# Patient Record
Sex: FEMALE | Race: WHITE
[De-identification: names, ages, dates, MRNs, and addresses within clinical notes are randomized per-mention and may not be internally consistent; named-entity substitution may affect disease eponyms.]

---

## 2017-03-01 ENCOUNTER — HOSPITAL ENCOUNTER (EMERGENCY)
Dept: HOSPITAL 68 - ERH | Age: 32
End: 2017-03-01
Payer: COMMERCIAL

## 2017-03-01 VITALS — SYSTOLIC BLOOD PRESSURE: 144 MMHG | DIASTOLIC BLOOD PRESSURE: 92 MMHG

## 2017-03-01 VITALS — WEIGHT: 200 LBS | HEIGHT: 60 IN | BODY MASS INDEX: 39.27 KG/M2

## 2017-03-01 DIAGNOSIS — R07.9: ICD-10-CM

## 2017-03-01 DIAGNOSIS — R10.11: Primary | ICD-10-CM

## 2017-03-01 LAB
ABSOLUTE GRANULOCYTE CT: 7.5 /CUMM (ref 1.4–6.5)
BASOPHILS # BLD: 0 /CUMM (ref 0–0.2)
BASOPHILS NFR BLD: 0.3 % (ref 0–2)
EOSINOPHIL # BLD: 0.6 /CUMM (ref 0–0.7)
EOSINOPHIL NFR BLD: 5 % (ref 0–5)
ERYTHROCYTE [DISTWIDTH] IN BLOOD BY AUTOMATED COUNT: 13.2 % (ref 11.5–14.5)
GRANULOCYTES NFR BLD: 63.4 % (ref 42.2–75.2)
HCT VFR BLD CALC: 38.7 % (ref 37–47)
LYMPHOCYTES # BLD: 3 /CUMM (ref 1.2–3.4)
MCH RBC QN AUTO: 28 PG (ref 27–31)
MCHC RBC AUTO-ENTMCNC: 33.3 G/DL (ref 33–37)
MCV RBC AUTO: 83.9 FL (ref 81–99)
MONOCYTES # BLD: 0.7 /CUMM (ref 0.1–0.6)
PLATELET # BLD: 237 /CUMM (ref 130–400)
PMV BLD AUTO: 9.7 FL (ref 7.4–10.4)
RED BLOOD CELL CT: 4.61 /CUMM (ref 4.2–5.4)
WBC # BLD AUTO: 11.8 /CUMM (ref 4.8–10.8)

## 2017-03-01 NOTE — CT SCAN REPORT
EXAMINATION:
CT ABDOMEN AND PELVIS WITHOUT CONTRAST
 
CLINICAL INFORMATION:
Right upper quadrant abdominal pain.
 
COMPARISON:
None
 
TECHNIQUE:
Multidetector volumetric imaging was performed from the superior aspect of
the liver through the pubic symphysis. Sagittal and coronal reformatted
images were obtained on the technologist's workstation.
 
DLP:
813 mGy-cm
 
FINDINGS:
 
LUNG BASES: The visualized lung bases are unremarkable.
 
LIVER, GALLBLADDER, AND BILIARY TREE: The liver is normal in size, shape, and
attenuation. No focal hepatic lesion or biliary ductal dilatation is present.
The gallbladder is mildly distended with a phrygian cap. No gallstones
visualized. No gallbladder wall thickening or pericholecystic fluid.
 
PANCREAS: Unremarkable.
 
SPLEEN: Unremarkable.
 
ADRENAL GLANDS: Unremarkable.
 
KIDNEYS AND URETERS: The kidneys are normal in size, shape, and attenuation.
No hydronephrosis, hydroureter, or calculi seen. No perinephric stranding.
 
BLADDER: Unremarkable.
 
GASTROINTESTINAL TRACT: The stomach and small bowel are unremarkable. No
dilated loops of bowel or evidence of obstruction. Normal appendix. No
colonic wall thickening or inflammatory change. Mild colonic stool burden.
 
ABDOMINAL WALL: No significant hernia is appreciated.
 
LYMPH NODES: Normal.
 
VASCULAR: Unremarkable.
 
PELVIC VISCERA: The uterus and adnexa are unremarkable.
 
OSSEOUS STRUCTURES: No acute or suspicious osseous abnormality. Sclerosis is
seen along the sacroiliac joints.
 
IMPRESSION:
No acute findings of the abdomen or pelvis. The gallbladder is mildly
distended. No wall thickening or adjacent inflammatory changes. No gallstones
are visualized.

## 2017-03-01 NOTE — ED GI/GU/ABDOMINAL COMPLAINT
**See Addendum**
History of Present Illness
 
General
Chief Complaint: Chest Pain
Stated Complaint: CP,SOB
Source: patient, old records
Exam Limitations: no limitations
 
Vital Signs & Intake/Output
Vital Signs & Intake/Output
 Vital Signs
 
 
Date Time Temp Pulse Resp B/P Pulse O2 O2 Flow FiO2
 
     Ox Delivery Rate 
 
2250 98.1 69 17 144/92 97 Room Air  
 
2132     97 Room Air  
 
2102 98.3 79 18 143/76 97 Room Air  
 
 
Allergies
Coded Allergies:
fish derived (Mild, NAUSEA 16)
shellfish derived (Mild, NAUSEA 16)
 
Reconcile Medications
Albuterol Sulfate (Proair Hfa) 0.09 MG/Actuation MIROSLAVA ASTHMA  (Reported)
Ibuprofen 800 MG TABLET   800 MG PO Q6P PRN PAIN SCALE 4-6 (MODERATE)
Prenatal Vit No.130/Iron/FA (Prenatal Tablet) 1 EACH TABLET   1 TAB PO DAILY 
vitamin supprot  (Reported)
 
Triage Note:
PT TO ROOM16 FROM HOME FOR C/O UPPER ABD PAIN 8/10
 RADIATES TO MIDDLE BACK x3DAYS, +NAUSEA. LAST BM
 YESTERDAY WNL. PT DENIES URINARY S/S, DENIES CHEST
 PAIN, REPORTS MILD SOB. PT AFEBRILE, VSS.
Triage Nurses Notes Reviewed? yes
LMP (ages 10-50): post partum 8 months breastfeeding
Pregnant? n
Is pt currently breastfeeding? Yes
Onset: Abrupt
Duration: day(s): (3), constant, getting worse, waxing and waning
Timing: recent history
Quality/Severity: aching, sharpness, severe, throbbing
Severity Numbers: 8
Location: right upper quadrant
Radiation: back
Activities at Onset: none
Prior Abdominal Problems: similar symptoms (gallstones)
Modifying Factors:
Worsens With: eating, palpation. 
Associated Symptoms: nausea/vomiting
HPI:
31-year-old female with past history of cholelithiasis asthma presents to 
emergency room complaining of 3 day history sudden onset right upper quadrant 
abdominal pain radiating into her back that is worse with palpation and eating. 
The patient states she has had history of gallstone attacks in the past however 
this is the worst it is ever been, however feels similar.  She's been taking 
Tylenol without improvement.  She reports some nausea and one episode of 
vomiting yesterday.  No change in her bowel movements.  Contrary to the chief 
complaint listed at triage the patient has no chest pain or shortness of breath 
no cough wheezing hemoptysis.  No sick contacts recent travel.  The patient 
states she was drinking alcohol 3 nights ago prior to the episode beginning.  
She is currently breast-feeding, no recent immobility or travel no history of 
abdominal surgeries in the past she denies tobacco use.
(ELIEZER ULRICH)
 
Past History
 
Travel History
Traveled to [place] past 21 day No
 
Medical History
Any Pertinent Medical History? see below for history
Neurological: NONE
EENT: NONE
Cardiovascular: NONE
Respiratory: asthma
Gastrointestinal: NONE
Hepatic: NONE
Renal: NONE
Musculoskeletal: NONE
Psychiatric: anxiety, depression
Endocrine: NONE (initial BMI 37.5), obesity
Blood Disorders: NONE
Cancer(s): NONE
GYN/Reproductive: NONE
Tetanus Vaccine: 12
 
Surgical History
Surgical History: 2011 TOPS at 9 weeks' 2007 partial resection of partial 
resection  left Bartholin's gland
 
Psychosocial History
Who do you live with Spouse
Services at Home None
What is your primary language English
Tobacco Use: Never used
ETOH Use: occasional use
 
Family History
Family History, If Any:
Relation not specified for:
  FH: hypertension
 
Hx Contributory? No
(ELIEZER ULRICH)
 
Review of Systems
 
Review of Systems
Constitutional:
Reports: see HPI. 
All Other Systems: Reviewed and Negative
Comments
Review of systems: See HPI, All other systems negative.
Constitutional, no chills no fever, no malaise 
HEENT: No visual changes no sore throat no congestion
Cardiovascular: No chest pain , no palpitation 
Skin, no rashes, no change in skin
Respiratory: No dyspnea no cough no  sputum
GI: No nausea no vomiting, no diarrhea
: No dysuria
Muscle skeletal: No joint pain,, no back pain, no neck pain,
Neurologic: No numbness, no headache
Psych: No stress 
Heme/endocrine: No bruising no bleeding 
Immunology: No lymphadenopathy
(ELIEZER ULRICH)
 
Physical Exam
 
Physical Exam
General Appearance: well developed/nourished, no apparent distress, alert, awake
Gastrointestinal: soft, tenderness
Comments:
Well-developed well-nourished person in no acute distress
HEENT: Normal EENT exam; PERRL, EOMI. HEAD is atraumatic. moist mucous 
membranes.  
Neck: Supple, normal range of motion 
Back: Nontender, no CVA tenderness. Full range of motion
Cardiovascular: Regular rate and rhythms no murmurs rubs or gallops
Respiratory: Chest nontender.There were no bony deformities, no asymmetry. No 
respiratory distress.  Patient speaking in full complete sentences. Breath 
sounds clear to auscultation bilaterally: NO W/R/R
Abdomen: Soft, negative  Hicks's, right upper quadrant tenderness palpation 
nondistended, no appreciable organomegaly. Normal bowel sounds. No rebound/
guarding, No ascites.
Extremity: No edema, full range of motion of extremities
Neuro: Alert oriented x3, motor sensory normal,  There were no obvious focal 
neurologic abnormalities.
Skin: No appreciable rash on exposed skin, skin is warm and dry.no jaundice
Psych: Mood and affect is normal, memory and judgment is normal.
 
Core Measures
ACS in differential dx? No
Severe Sepsis Present: No
Septic Shock Present: No
(EVIE KRISHNA,ELIEZER)
 
Progress
Differential Diagnosis: AMI, appendicitis, biliary colic, bowel obstruction, 
colon cancer, cholecystitis, ectopic pregnancy, gastritis, hepatitis, inflamm 
bowel dis, intrauterine pregnancy, pancreatitis, peptic ulcer, PUD/GERD, 
perforated viscous, SBO, threatened AB, UTI/pyelo
Plan of Care:
 Orders
 
 
Procedure Date/time Status
 
URINALYSIS 2114 Complete
 
LIPASE 2106 Complete
 
HUMAN BETA HCG SCREEN 2106 Complete
 
COMPREHENSIVE METABOLIC PANEL 2106 Complete
 
CBC WITHOUT DIFFERENTIAL 2106 Complete
 
AMYLASE 2106 Complete
 
EKG 2055 Active
 
 
 Laboratory Tests
 
 
 
17:
Urine Color STRAW, Urine Clarity HAZY  H, Urine pH 6.5, Ur Specific Gravity 
1.015, Urine Protein NEG, Urine Ketones NEG, Urine Nitrite NEG, Urine Bilirubin 
NEG, Urine Urobilinogen 0.2, Ur Leukocyte Esterase SMALL  H, Ur Microscopic 
SEDIMENT EXAMINED, Urine WBC 1-3  H, Ur Epithelial Cells MOD  H, Urine Bacteria 
FEW  H, Urine Mucus FEW, Urine Hemoglobin NEG, Urine Glucose NEG
 
17:
Anion Gap 11, Estimated GFR > 60, BUN/Creatinine Ratio 21.4, Glucose 86, Calcium
10.1, Total Bilirubin 0.5, AST 26, ALT 27, Alkaline Phosphatase 95, Total 
Protein 7.3, Albumin 4.0, Globulin 3.3, Albumin/Globulin Ratio 1.2, Amylase 34, 
Lipase 91, Total Beta HCG NEGATIVE, CBC w Diff NO MAN DIFF REQ, RBC 4.61, MCV 
83.9, MCH 28.0, RDW 13.2, MPV 9.7, Gran % 63.4, Lymphocytes % 25.4, Monocytes % 
5.9, Eosinophils % 5.0, Basophils % 0.3, Absolute Granulocytes 7.5  H, Absolute 
Lymphocytes 3.0, Absolute Monocytes 0.7  H, Absolute Eosinophils 0.6, Absolute 
Basophils 0, PUBS MCHC 33.3
labs ordered, old records reviewed, pt med with toradol 30mg iv, zofran 4mg iv, 
iv fluids
 
 
3/1/2017 10:26:02 PM I discussed with the patient her lab results to date 
reports mild improvement in pain with Toradol, denies nausea pending CT
 
3/1/2017 10:36:26 PM discussed with the patient at length her CAT scan findings,
given ultrasound is unavailable this evening the patient is provided with 
outpatient ultrasound form to have done tomorrow I advised bland diet Tylenol 
Motrin, the patient reports the pain has improved her abdomen reveals only mild 
tenderness to palpation of the right upper quadrant, lungs are clear to 
auscultation she's had no episodes of vomiting here afebrile nontoxic appearing 
answered all of her questions she will return anytime sooner with any concerns
(EVIE KRISHNA,ELIEZER)
Diagnostic Imaging:
Viewed by Me: CT Scan.  Discussed w/RAD: CT Scan. 
Radiology Impression: PATIENT: ANGELINA ORTEGA  MEDICAL RECORD NO: [id number] 
PRESENT AGE: 31  PATIENT ACCOUNT NO: [id number] : [birth year]  LOCATION: [facility] 
ORDERING PHYSICIAN: ELIEZER KRISHNA     SERVICE DATE:  EXAM TYPE: 
CAT - CT ABD & PELVIS W/O IV CONTRAS EXAMINATION: CT ABDOMEN AND PELVIS WITHOUT 
CONTRAST CLINICAL INFORMATION: Right upper quadrant abdominal pain. COMPARISON: 
None TECHNIQUE: Multidetector volumetric imaging was performed from the superior
aspect of the liver through the pubic symphysis. Sagittal and coronal 
reformatted images were obtained on the technologist's workstation. DLP: 813 mGy
-cm FINDINGS: LUNG BASES: The visualized lung bases are unremarkable. LIVER, 
GALLBLADDER, AND BILIARY TREE: The liver is normal in size, shape, and 
attenuation. No focal hepatic lesion or biliary ductal dilatation is present. 
The gallbladder is mildly distended with a phrygian cap. No gallstones 
visualized. No gallbladder wall thickening or pericholecystic fluid. PANCREAS: 
Unremarkable. SPLEEN: Unremarkable. ADRENAL GLANDS: Unremarkable. KIDNEYS AND 
URETERS: The kidneys are normal in size, shape, and attenuation. No 
hydronephrosis, hydroureter, or calculi seen. No perinephric stranding. BLADDER:
Unremarkable. GASTROINTESTINAL TRACT: The stomach and small bowel are 
unremarkable. No dilated loops of bowel or evidence of obstruction. Normal 
appendix. No colonic wall thickening or inflammatory change. Mild colonic stool 
burden. ABDOMINAL WALL: No significant hernia is appreciated. LYMPH NODES: 
Normal. VASCULAR: Unremarkable. PELVIC VISCERA: The uterus and adnexa are 
unremarkable. OSSEOUS STRUCTURES: No acute or suspicious osseous abnormality. 
Sclerosis is seen along the sacroiliac joints. IMPRESSION: No acute findings of 
the abdomen or pelvis. The gallbladder is mildly distended. No wall thickening 
or adjacent inflammatory changes. No gallstones are visualized. DICTATED BY: 
LOGAN HERNÁNDEZ MD  DATE/TIME DICTATED:17 :
RAD.[facility]  DATE/TIME TRANSCRIBED:17 CONFIDENTIAL, DO NOT COPY 
WITHOUT APPROPRIATE AUTHORIZATION.  <Electronically signed in Other Vendor 
System>                                                                         
              SIGNED BY: LOGAN HERNÁNDEZ MD 17
Initial ED EKG: normal axis (80), normal intervals, normal p-waves, normal QRS 
complex
Prior EKG: unchanged
(ELIEZER ULRICH)
 
Departure
 
Departure
Time of Disposition: 
Disposition: HOME OR SELF CARE
Condition: Stable
Clinical Impression
Primary Impression: Abdominal pain
Referrals:
KARLA RAO (PCP/Family)
 
Additional Instructions:
FOLLOW UP for outpatient ultrasound of your gallbladder tomorrow.  [place] diet no
fatty spicy greasy foods and limit alcohol use. Tylenol as needed for pain
Departure Forms:
Customer Survey
General Discharge Information
(ELIEZER ULRICH)
 
PA/NP Co-Sign Statement
Statement:
ED Attending supervision documentation-
 
[X] I saw and evaluated the patient. I have also reviewed all the pertinent lab 
results and diagnostic results. I agree with the findings and the plan of care 
as documented in the PA's/NP's documentation. 
 
[] I have reviewed the ED Record and agree with the PA's/NP's documentation.
 
[] Additions or exceptions (if any) to the PAs/NP's note and plan are 
summarized below:
[]
 
(LUPE WHITE DO

## 2018-07-20 ENCOUNTER — HOSPITAL ENCOUNTER (OUTPATIENT)
Dept: HOSPITAL 68 - STS | Age: 33
End: 2018-07-20
Attending: SURGERY
Payer: COMMERCIAL

## 2018-07-20 VITALS — WEIGHT: 197 LBS | HEIGHT: 60 IN | BODY MASS INDEX: 38.68 KG/M2

## 2018-07-20 DIAGNOSIS — D21.6: Primary | ICD-10-CM

## 2018-07-20 DIAGNOSIS — J45.909: ICD-10-CM

## 2018-07-20 DIAGNOSIS — D22.39: ICD-10-CM

## 2018-07-23 NOTE — OPERATIVE REPORT
Operative/Inv Procedure Report
Surgery Date: 07/20/18
Name of Procedure:
Excision of deep subfascial 7.5 cm fatty mass left lower back
 
Excision of pigmented skin lesion chin, 1.4 cm diameter, intermediate complexity
layered closure 1.6 cm 
Pre-Operative Diagnosis:
Soft tissue mass subcutaneous back
 
Pigmented skin lesion chin
Post-Operative Diagnosis:
Same back mass deep subfascial
Estimated Blood Loss: scant
Surgeon/Assistant:
Greer SAM,Mariusz KRISHNA
Anesthesia: general endotracheal tube
 
Operative/Procedure Note
Note:
Patient initially supine after successful induction of general anesthesiam, her 
chin was prepped and draped in usual sterile fashion and elliptical incision 
following the longest lines were drawn around this protruding lesion the area 
was infiltrated with local anesthetic and the lesion was excised with a thin rim
of normal skin around it extended subcutaneously care was taken to ensure 
hemostasis with the cautery and complete excision the resulting defect was 
checked for hemostasis irrigated and closed back up in layers using interrupted 
5-0 Vicryl sutures subdermally and then 5-0 nylon continuous for the skin 
followed by bacitracin Telfa and Tegaderm then she was repositioned in right 
lateral down and reprepped and draped we had already marked this mass earlier 
because it was difficult to feel because of the depth of the subcutaneous tissue
here we aimed a transverse incision injected local anesthetic and then made the 
6 cm incision with a 10 blade deepened it with cautery straight through the 
subcutaneous layer clearing off the deeper fascia which was then incised sharply
and then using progressively deeper retractors developed the plane around a 
partially encapsulated smooth round not particularly lobulated fatty mass that 
tapered deep tracking laterally / anteriorly and it took some time to follow it 
that far to make sure we were getting it completely out and that's where there 
were some vessels were but were easily controlled just with cautery.  This mass 
measured 7 cm, the area was inspected for hemostasis the deeper fascia was 
reapproximated with interrupted 2-0 Vicryl sutures and a few more subdermally to
line up the skin which was closed with a running subcuticular 4-0 Biosyn suture 
followed by Mastisol Steri-Strips Telfa and Tegaderm